# Patient Record
Sex: FEMALE | Race: BLACK OR AFRICAN AMERICAN | Employment: OTHER | URBAN - METROPOLITAN AREA
[De-identification: names, ages, dates, MRNs, and addresses within clinical notes are randomized per-mention and may not be internally consistent; named-entity substitution may affect disease eponyms.]

---

## 2019-01-02 NOTE — PATIENT DISCUSSION
FinalAcuvue for AstigmatismOD-[id number]/25&nbsp;SN &nbsp; &nbsp; [facility]

## 2019-01-02 NOTE — PATIENT DISCUSSION
01/02/2019Acuvue for AstigmatismOS-[id number]/25&nbsp;SN &nbsp; &nbsp; [facility]

## 2020-06-24 NOTE — PATIENT DISCUSSION
06/24/20201-[id number]/25&nbsp;SN &nbsp; &nbsp; cld

## 2022-01-31 ENCOUNTER — ESTABLISHED PATIENT (OUTPATIENT)
Dept: URBAN - METROPOLITAN AREA CLINIC 20 | Facility: CLINIC | Age: 62
End: 2022-01-31

## 2022-01-31 DIAGNOSIS — H35.371: ICD-10-CM

## 2022-01-31 PROCEDURE — 99214 OFFICE O/P EST MOD 30 MIN: CPT

## 2022-02-03 ASSESSMENT — VISUAL ACUITY
OU_SC: J1
OS_SC: 20/20
OD_SC: 20/30-1
OU_SC: 20/20

## 2022-02-03 ASSESSMENT — TONOMETRY
OS_IOP_MMHG: 10
OD_IOP_MMHG: 11

## 2022-03-09 ENCOUNTER — TECH ONLY (OUTPATIENT)
Dept: URBAN - METROPOLITAN AREA CLINIC 20 | Facility: CLINIC | Age: 62
End: 2022-03-09

## 2022-03-09 DIAGNOSIS — H40.013: ICD-10-CM

## 2022-03-09 PROCEDURE — 99211T TECH SERVICE

## 2022-03-09 PROCEDURE — 92083 EXTENDED VISUAL FIELD XM: CPT

## 2022-06-17 ENCOUNTER — ESTABLISHED PATIENT (OUTPATIENT)
Dept: URBAN - METROPOLITAN AREA CLINIC 19 | Facility: CLINIC | Age: 62
End: 2022-06-17

## 2022-06-17 DIAGNOSIS — H40.013: ICD-10-CM

## 2022-06-17 PROCEDURE — 92012 INTRM OPH EXAM EST PATIENT: CPT

## 2022-06-17 ASSESSMENT — TONOMETRY
OD_IOP_MMHG: 14
OS_IOP_MMHG: 11

## 2022-06-17 ASSESSMENT — VISUAL ACUITY
OU_SC: J1
OS_SC: 20/20
OD_SC: 20/30-2

## 2024-01-05 ENCOUNTER — ESTABLISHED PATIENT (OUTPATIENT)
Dept: URBAN - METROPOLITAN AREA CLINIC 20 | Facility: CLINIC | Age: 64
End: 2024-01-05

## 2024-01-05 DIAGNOSIS — R51.9: ICD-10-CM

## 2024-01-05 DIAGNOSIS — H40.013: ICD-10-CM

## 2024-01-05 PROCEDURE — 99213 OFFICE O/P EST LOW 20 MIN: CPT

## 2024-01-05 ASSESSMENT — VISUAL ACUITY
OD_SC: 20/30-2
OS_SC: 20/20

## 2024-01-05 ASSESSMENT — TONOMETRY
OS_IOP_MMHG: 5
OD_IOP_MMHG: 9

## 2024-07-08 ENCOUNTER — ESTABLISHED PATIENT (OUTPATIENT)
Dept: URBAN - METROPOLITAN AREA CLINIC 20 | Facility: CLINIC | Age: 64
End: 2024-07-08

## 2024-07-08 DIAGNOSIS — H40.013: ICD-10-CM

## 2024-07-08 PROCEDURE — 92133 CPTRZD OPH DX IMG PST SGM ON: CPT

## 2024-07-08 PROCEDURE — 99213 OFFICE O/P EST LOW 20 MIN: CPT

## 2024-07-08 ASSESSMENT — VISUAL ACUITY
OD_SC: 20/25-1
OS_SC: 20/20-2

## 2024-07-08 ASSESSMENT — TONOMETRY
OD_IOP_MMHG: 12
OS_IOP_MMHG: 11

## 2024-12-31 ENCOUNTER — TECH ONLY (OUTPATIENT)
Age: 64
End: 2024-12-31

## 2024-12-31 DIAGNOSIS — H40.013: ICD-10-CM

## 2024-12-31 PROCEDURE — 99211T TECH SERVICE

## 2024-12-31 PROCEDURE — 92083 EXTENDED VISUAL FIELD XM: CPT

## 2025-01-20 ENCOUNTER — FOLLOW UP (OUTPATIENT)
Age: 65
End: 2025-01-20

## 2025-01-20 DIAGNOSIS — H40.013: ICD-10-CM

## 2025-01-20 PROCEDURE — 99213 OFFICE O/P EST LOW 20 MIN: CPT
